# Patient Record
Sex: FEMALE | Race: WHITE
[De-identification: names, ages, dates, MRNs, and addresses within clinical notes are randomized per-mention and may not be internally consistent; named-entity substitution may affect disease eponyms.]

---

## 2023-08-01 ENCOUNTER — APPOINTMENT (OUTPATIENT)
Dept: PEDIATRIC ORTHOPEDIC SURGERY | Facility: CLINIC | Age: 58
End: 2023-08-01
Payer: COMMERCIAL

## 2023-08-01 VITALS
HEIGHT: 60 IN | SYSTOLIC BLOOD PRESSURE: 149 MMHG | BODY MASS INDEX: 29.06 KG/M2 | TEMPERATURE: 96.9 F | WEIGHT: 148 LBS | DIASTOLIC BLOOD PRESSURE: 88 MMHG

## 2023-08-01 DIAGNOSIS — M23.8X1 OTHER INTERNAL DERANGEMENTS OF RIGHT KNEE: ICD-10-CM

## 2023-08-01 DIAGNOSIS — Z78.9 OTHER SPECIFIED HEALTH STATUS: ICD-10-CM

## 2023-08-01 DIAGNOSIS — Z83.3 FAMILY HISTORY OF DIABETES MELLITUS: ICD-10-CM

## 2023-08-01 DIAGNOSIS — Z83.49 FAMILY HISTORY OF OTHER ENDOCRINE, NUTRITIONAL AND METABOLIC DISEASES: ICD-10-CM

## 2023-08-01 DIAGNOSIS — S76.311A STRAIN OF MUSCLE, FASCIA AND TENDON OF THE POSTERIOR MUSCLE GROUP AT THIGH LEVEL, RIGHT THIGH, INITIAL ENCOUNTER: ICD-10-CM

## 2023-08-01 PROBLEM — Z00.00 ENCOUNTER FOR PREVENTIVE HEALTH EXAMINATION: Status: ACTIVE | Noted: 2023-08-01

## 2023-08-01 PROCEDURE — 99202 OFFICE O/P NEW SF 15 MIN: CPT | Mod: 25

## 2023-08-01 PROCEDURE — 73562 X-RAY EXAM OF KNEE 3: CPT | Mod: RT

## 2023-08-01 RX ORDER — SIMVASTATIN 80 MG/1
TABLET, FILM COATED ORAL
Refills: 0 | Status: ACTIVE | COMMUNITY

## 2023-08-01 NOTE — ASSESSMENT
[FreeTextEntry1] : Impression: Internal derangement right knee, right hamstring strain.  This patient will be treated with a home exercise program along with a course of Celebrex with GI precautions.  If she is not improving within the next week to 10 days physical therapy will be advised.  Return on a as needed basis

## 2023-08-01 NOTE — PHYSICAL EXAM
[de-identified] : Examination today reveals she is mildly overweight.  She has a mild antalgic gait on the right side.  Good motion to the right hip the right knee has good motion though there is discomfort on full flexion.  A moderate effusion today is noted no instability on stress she has discomfort along the lateral aspect of the knee in the region of the collateral ligament though no instability noted.  There is mild discomfort on patellofemoral grind there is also discomfort noted along the distal medial hamstring group.  Straight leg raising is negative she is neurologically intact.  X-rays ordered and taken today of the right knee reveal mild degenerative change no loose body/lesion

## 2023-08-01 NOTE — HISTORY OF PRESENT ILLNESS
[de-identified] : This 58-year-old healthy pleasant lady is seen today for evaluation of the right lower extremity.  She was well until 3 weeks ago when she tripped over a book injuring her right knee and posterior thigh.  This was associated with mild swelling no bruising stiffness to the knee along with limp.  She has had some improvement over time and she fell again just a couple of days ago.  She has not had any locking buckling or sensation of instability.  No numbness or paresthesias.  Past history is otherwise unremarkable except for a remote history of ulcer disease.  It is to be noted she did take Advil recently without any discomfort